# Patient Record
Sex: FEMALE | Race: WHITE | NOT HISPANIC OR LATINO | Employment: FULL TIME | URBAN - NONMETROPOLITAN AREA
[De-identification: names, ages, dates, MRNs, and addresses within clinical notes are randomized per-mention and may not be internally consistent; named-entity substitution may affect disease eponyms.]

---

## 2017-07-10 ENCOUNTER — OFFICE VISIT (OUTPATIENT)
Dept: OTOLARYNGOLOGY | Facility: CLINIC | Age: 37
End: 2017-07-10

## 2017-07-10 VITALS
HEART RATE: 100 BPM | SYSTOLIC BLOOD PRESSURE: 126 MMHG | HEIGHT: 63 IN | WEIGHT: 115.5 LBS | BODY MASS INDEX: 20.46 KG/M2 | DIASTOLIC BLOOD PRESSURE: 85 MMHG | TEMPERATURE: 100.7 F

## 2017-07-10 DIAGNOSIS — K21.9 LARYNGOPHARYNGEAL REFLUX: ICD-10-CM

## 2017-07-10 DIAGNOSIS — K44.9 HIATAL HERNIA: ICD-10-CM

## 2017-07-10 DIAGNOSIS — J37.0 CHRONIC LARYNGITIS: Primary | ICD-10-CM

## 2017-07-10 PROCEDURE — 99203 OFFICE O/P NEW LOW 30 MIN: CPT | Performed by: OTOLARYNGOLOGY

## 2017-07-10 RX ORDER — RANITIDINE 150 MG/1
150 TABLET ORAL 2 TIMES DAILY
COMMUNITY

## 2017-07-10 RX ORDER — FLUTICASONE PROPIONATE 50 MCG
2 SPRAY, SUSPENSION (ML) NASAL DAILY
Qty: 1 BOTTLE | Refills: 6 | Status: SHIPPED | OUTPATIENT
Start: 2017-07-10 | End: 2017-08-09

## 2017-07-10 NOTE — PROGRESS NOTES
"Patient Care Team:  Mel Bain DO as PCP - General (Family Medicine)  John Ordonez MD as Consulting Physician (Otolaryngology)    Chief Complaint   Patient presents with   • Difficulty Swallowing     Feels like something in throat   • Hoarse       Subjective   History of Present Illness:  Carolynn Carvajal is a  36 y.o. female who complains of hoarseness, voice change, acid reflux and a loss of her singing range. The symptoms are not localized to a particular location. The patient has had moderate symptoms. The symptoms have been on and off for the last year . The symptoms are aggravated by  excessive voice use and reflux and a hiatal hernia . There have been no factors that have improved the symptoms. She has tried allergy medication and H2 blockers.    Review of Systems:  Review of Systems   Constitutional: Positive for fever. Negative for chills and fatigue.   HENT:        See HPI   Respiratory: Positive for choking (\"food and meds get stuck\"). Negative for cough and wheezing.    Cardiovascular: Negative.    Gastrointestinal: Negative for constipation, diarrhea, nausea and vomiting.   Allergic/Immunologic: Positive for environmental allergies. Negative for food allergies.   Neurological: Negative for dizziness, light-headedness and headaches.   Psychiatric/Behavioral: Negative for agitation, behavioral problems and sleep disturbance. The patient is not nervous/anxious.        Past History:  History reviewed. No pertinent past medical history.  Past Surgical History:   Procedure Laterality Date   •  SECTION     •  SECTION     • CHOLECYSTECTOMY  2008   • TUBAL ABDOMINAL LIGATION       Family History   Problem Relation Age of Onset   • Hypertension Father    • Cancer Paternal Grandmother      Social History   Substance Use Topics   • Smoking status: Never Smoker   • Smokeless tobacco: Never Used   • Alcohol use No       Current Outpatient Prescriptions:   •  raNITIdine " (ZANTAC) 150 MG tablet, Take 150 mg by mouth 2 (Two) Times a Day., Disp: , Rfl:   Allergies:  Review of patient's allergies indicates no known allergies.    Objective     Vital Signs:  Temp:  [100.7 °F (38.2 °C)] 100.7 °F (38.2 °C)  Heart Rate:  [100] 100  BP: (126)/(85) 126/85    Physical Exam:  CONSTITUTIONAL: well nourished, well-developed, alert, oriented, in no acute distress   COMMUNICATION AND VOICE: able to communicate normally, normal voice quality  HEAD: normocephalic, no lesions, atraumatic, no tenderness, no masses   FACE: appearance normal, no lesions, no tenderness, no deformities, facial motion symmetric  SALIVARY GLANDS: parotid glands with no tenderness, no swelling, no masses, submandibular glands with normal size, nontender  EYES: ocular motility normal, eyelids normal, orbits normal, no proptosis, conjunctiva normal , pupils equal, round   EARS:  Hearing: response to conversational voice normal bilaterally   External Ears: auricles without lesions  Otoscopic: tympanic membrane appearance normal, no lesions, no perforation, normal mobility, no fluid  NOSE:  External Nose: structure normal, no tenderness on palpation, no nasal discharge, no lesions, no evidence of trauma, nostrils patent   Intranasal Exam: nasal mucosa normal, vestibule within normal limits, inferior turbinate normal, nasal septum midline   Nasopharynx: nasopharyngeal mucosa, adenoids within normal limits  ORAL:  Lips: upper and lower lips without lesion   Teeth: dentition within normal limits for age   Gums: gingivae healthy   Oral Mucosa: oral mucosa normal, no mucosal lesions   Floor of Mouth: Warthin’s duct patent, mucosa normal  Tongue: lingual mucosa normal without lesions, normal tongue mobility   Palate: soft and hard palates with normal mucosa and structure  Oropharynx: oropharyngeal mucosa normal, tonsils normal in appearance   HYPOPHARYNX: hypopharyngeal mucosa normal  LARYNX: normal epiglottis appearance present, normal  arytenoid appearance present, normal true vocal cord appearance present, normal true vocal cord mobility present, diffuse mucosal inflammation present with posterior arytenoid edema and erythema present, epiglottis and arytenoid cartilage within normal limits, vocal cord mucosa normal with normal mobility,  NECK: neck appearance normal, no masses or tenderness  THYROID: no overt thyromegaly, no tenderness, nodules or mass present on palpation, position midline   LYMPH NODES: no lymphadenopathy  CHEST/RESPIRATORY: respiratory effort normal, normal breath sounds   CARDIOVASCULAR: rate and rhythm normal, extremities without cyanosis or edema    NEUROLOGIC/PSYCHIATRIC: oriented to time, place and person, mood normal, affect appropriate, CN II-XII intact grossly      Assessment   1. Chronic laryngitis    2. Laryngopharyngeal reflux    3. Hiatal hernia        Plan   Medical and surgical options were discussed. Risks, benefits and alternatives were discussed and questions were answered.      -------MEDICATIONS:-------  Continue Zantac  mucinex 600 mg po bid  fluticisone     -----INSTRUCTIONS-----  Call for problems, especially  Worsening voice, increasing swallowing problems, neck mass, shortness of breath, stridor or hemoptysis.  Increase water/ non caffeinated drink intake to at least 6-8 glasses a day. Decrease caffeine intake. Plain Mucinex over the counter as needed to thin out secretions.  Sleep with the head of bed on an incline. No large meals 1-2 hrs prior to sleep. The patient was instructed to use PPI's 30 minutes prior to the last meal of the day. The patient was advised to avoid fatty meals and caffine or alcohol prior to sleep.      -----FOLLOW UP-----  Follow up  in 3 months    John Ordonez MD  07/10/17  10:41 AM

## 2017-07-10 NOTE — PATIENT INSTRUCTIONS
Sleep with the head of bed on an incline. No large meals 1-2 hrs prior to sleep. The patient was instructed to use PPI's 30 minutes prior to the last meal of the day. The patient was advised to avoid fatty meals and caffine or alcohol prior to sleep. Increase water/ non caffeinated drink intake to at least 6-8 glasses of  a day. Decrease caffeine intake. Plain Mucinex over the counter as needed to thin out secretions.      ALL ABOUT HEARTBURN AND THE LIFESTYLE CHANGES THAT HELP    Lifestyle Changes Can Help Relieve The Burning Pain In Your Tummy    What is Heartburn?  Heartburn occurs when the lining of the esophagus (the tube that connects the mouth to the stomach) is exposed to and irritated by stomach acid.  Heartburn often feels like a burning pain in the middle of your chest that moves up your throat.  You may also have the sensation that food has come back into your mouth, leaving a sour or bitter taste.  Almost everyone has heartburn once in a while.  But if you have heartburn 2 or more times per week, it can be a sign of a more serious problem call gastroesophogeal reflux disease, or GERD.    What causes Heartburn?  Heartburn usually occurs when the valve at the neisha of the stomach (the lower esophageal sphincter or LES) doesn’t close the way it should.  If the LES is weak or opens at the wrong time, stomach acid can reflux (flow back) into the esophagus and cause heartburn.  Factors that can affect the LES include:    • Eating or drinking certain foods and beverages such as chocolate, peppermint, fried or fatty foods, coffee, or drinks that contain alcohol  • Having a hiatal hernia - a common physical condition where part of the stomach protrudes up through the diaphragm  • Lying down  • Smoking cigarettes  • Taking certain medications (be sure to tell your doctor about all medications or supplements you take)    What foods can make heartburn worse?  All foods cause the stomach to produce acid, although  foods can affect people in different ways.  Following is a list of foods and beverages that may aggravate your symptoms.  You may want to eat them less often.    • Fried or fatty foods  • Heavy seasoning and spicy foods  • Coffee  • Onions  • Orange juice, grapefruit juice, and tomato juice  • Alcoholic beverages  • Chocolate  • Peppermint and spearmint    What else can I do to help control my heartburn?  Try these lifestyle changes:  • Stop Smoking  • Lose weight - if you’re overweight  • Exercise to help control your weight (talk to your doctor first before starting any exercise program).  • Eat small, well-balanced meals  • Reduce abdominal pressure-don’t wear tight belts or tight clothing  • Avoid eating within 2 hours before bedtime  • Elevate your bed so the head is 6 to 8 inches higher than the foot.  This can help reduce acid reflux at night  • Let your doctor know about all the drugs and supplements you are taking.  Some of them may contribute to your heartburn.    What if these things don’t work?  Talk to your doctor, who can discuss many treatments with you.  Although there are several possible causes of heartburn associated with GERD, they all involve stomach acids.  So no matter what the cause may be, the medicines to reduce stomach acid can prevent heartburn.

## 2017-10-23 ENCOUNTER — OFFICE VISIT (OUTPATIENT)
Dept: OTOLARYNGOLOGY | Facility: CLINIC | Age: 37
End: 2017-10-23

## 2017-10-23 VITALS
TEMPERATURE: 99 F | DIASTOLIC BLOOD PRESSURE: 70 MMHG | HEIGHT: 63 IN | SYSTOLIC BLOOD PRESSURE: 112 MMHG | WEIGHT: 122.5 LBS | BODY MASS INDEX: 21.71 KG/M2

## 2017-10-23 DIAGNOSIS — J37.0 CHRONIC LARYNGITIS: ICD-10-CM

## 2017-10-23 DIAGNOSIS — K21.9 LARYNGOPHARYNGEAL REFLUX: Primary | ICD-10-CM

## 2017-10-23 DIAGNOSIS — J38.2 VOCAL CORD NODULES: ICD-10-CM

## 2017-10-23 PROCEDURE — 99213 OFFICE O/P EST LOW 20 MIN: CPT | Performed by: OTOLARYNGOLOGY

## 2017-10-23 RX ORDER — GUAIFENESIN 600 MG/1
1200 TABLET, EXTENDED RELEASE ORAL 2 TIMES DAILY
COMMUNITY

## 2017-10-23 NOTE — PROGRESS NOTES
Patient Intake Note    Review of Systems  Review of Systems   Constitutional: Negative for chills, fatigue and fever.   HENT:        See HPI   Respiratory: Negative for cough, choking, shortness of breath and wheezing.    Cardiovascular: Negative.    Gastrointestinal: Negative for constipation, diarrhea, nausea and vomiting.   Allergic/Immunologic: Positive for environmental allergies. Negative for food allergies.   Neurological: Negative for dizziness, light-headedness and headaches.   Hematological: Does not bruise/bleed easily.   Psychiatric/Behavioral: Negative for sleep disturbance.         Salud Silva  10/23/2017  2:07 PM

## 2017-10-23 NOTE — PROGRESS NOTES
"Patient Care Team:  Mel Bain DO as PCP - General (Family Medicine)  John Ordonez MD as Consulting Physician (Otolaryngology)    Chief Complaint   Patient presents with   • Follow-up       Subjective   HPI   Carolynn Carvajal is a  36 y.o. female who presents for follow up. She was last seen on 7/10/17 with complaints of hoarseness, voice change, acid reflux and a loss of her singing range. She was treated with Zantac, reflux precautions, fluticisone and hydrat  ion with mucinex. She does not feel there has been a change in the voice. She cannot sing and has times when \" only air passes through\". She has had acid reflux \"all the time\" and is on a H2 blocker. She has not been tolerant of proton pump inhibitor's.    Review of Systems:  Reviewed per patient intake note    Past History:  No past medical history on file.  Past Surgical History:   Procedure Laterality Date   •  SECTION     •  SECTION     • CHOLECYSTECTOMY  2008   • TUBAL ABDOMINAL LIGATION       Family History   Problem Relation Age of Onset   • Hypertension Father    • Cancer Paternal Grandmother      Social History   Substance Use Topics   • Smoking status: Never Smoker   • Smokeless tobacco: Never Used   • Alcohol use No       Current Outpatient Prescriptions:   •  guaiFENesin (MUCINEX) 600 MG 12 hr tablet, Take 1,200 mg by mouth 2 (Two) Times a Day., Disp: , Rfl:   •  raNITIdine (ZANTAC) 150 MG tablet, Take 150 mg by mouth 2 (Two) Times a Day., Disp: , Rfl:   Allergies:  Review of patient's allergies indicates no known allergies.    Objective     Vital Signs:  Temp:  [99 °F (37.2 °C)] 99 °F (37.2 °C)  BP: (112)/(70) 112/70    Physical Exam  CONSTITUTIONAL: well nourished, well-developed, alert, oriented, in no acute distress   COMMUNICATION AND VOICE: able to communicate normally, mild coarse voice present,  HEAD: normocephalic, no lesions, atraumatic, no tenderness, no masses   FACE: appearance normal, no " lesions, no tenderness, no deformities, facial motion symmetric  EYES: ocular motility normal, eyelids normal, orbits normal, no proptosis, conjunctiva normal , pupils equal, round  HEARING: response to conversational voice normal bilaterally   EXTERNAL EARS: auricles without lesions  EXTERNAL NOSE: structure normal, no tenderness on palpation, no nasal discharge, no lesions, no evidence of trauma, nostrils patent  LIPS: structure normal, no tenderness on palpation, no lesions, no evidence of trauma  HYPOPHARYNX: hypopharyngeal mucosa normal  LARYNX: normal epiglottis appearance present, normal arytenoid appearance present, normal true vocal cord mobility present, no airway obstruction present, mild bilateral true vocal cord nodule present,  NECK: neck appearance normal  LYMPH NODES: no lymphadenopathy  CHEST/RESPIRATORY: respiratory effort normal  CARDIOVASCULAR: extremities without cyanosis or edema, no overt jugulovenous distension present  NEUROLOGIC/PSYCHIATRIC: oriented appropriately for age, mood normal, affect appropriate, cranial nerves intact grossly unless specifically mentioned above         Assessment   1. Laryngopharyngeal reflux    2. Chronic laryngitis    3. Vocal cord nodules        Plan   Continue current management plan.     -----INSTRUCTIONS-----  Increase water/ non caffeinated drink intake to at least 6-8 glasses a day. Decrease caffeine intake. Plain Mucinex over the counter as needed to thin out secretions.  Limit voice use for 2 weeks. If talking is necessary, use a low volume voice rather than a whisper. Avoid coughing and throat clearing as much as possible.    QUALITY MEASURES  Body Mass Index Screening and Follow-Up Plan  Body mass index is 22.05 kg/(m^2).      Tobacco Use: Screening and Cessation Intervention  Smoking status: Never Smoker                                                              Smokeless status: Never Used                            Return in about 4 months (around  2/23/2018).    John Ordonez MD  10/23/17  3:00 PM